# Patient Record
Sex: FEMALE
[De-identification: names, ages, dates, MRNs, and addresses within clinical notes are randomized per-mention and may not be internally consistent; named-entity substitution may affect disease eponyms.]

---

## 2018-11-28 ENCOUNTER — HOSPITAL ENCOUNTER (OUTPATIENT)
Dept: HOSPITAL 47 - RADECHMAIN | Age: 53
Discharge: HOME | End: 2018-11-28
Attending: FAMILY MEDICINE
Payer: COMMERCIAL

## 2018-11-28 DIAGNOSIS — I08.1: Primary | ICD-10-CM

## 2018-11-28 PROCEDURE — 93306 TTE W/DOPPLER COMPLETE: CPT

## 2019-01-18 ENCOUNTER — HOSPITAL ENCOUNTER (OUTPATIENT)
Dept: HOSPITAL 47 - OR | Age: 54
Discharge: HOME | End: 2019-01-18
Payer: COMMERCIAL

## 2019-01-18 VITALS — BODY MASS INDEX: 47 KG/M2

## 2019-01-18 VITALS — TEMPERATURE: 97.4 F

## 2019-01-18 VITALS — DIASTOLIC BLOOD PRESSURE: 63 MMHG | SYSTOLIC BLOOD PRESSURE: 133 MMHG

## 2019-01-18 VITALS — HEART RATE: 90 BPM | RESPIRATION RATE: 16 BRPM

## 2019-01-18 DIAGNOSIS — Z80.0: ICD-10-CM

## 2019-01-18 DIAGNOSIS — Z88.5: ICD-10-CM

## 2019-01-18 DIAGNOSIS — E03.9: ICD-10-CM

## 2019-01-18 DIAGNOSIS — K76.0: ICD-10-CM

## 2019-01-18 DIAGNOSIS — Z79.890: ICD-10-CM

## 2019-01-18 DIAGNOSIS — E66.01: ICD-10-CM

## 2019-01-18 DIAGNOSIS — K66.0: ICD-10-CM

## 2019-01-18 DIAGNOSIS — K80.44: Primary | ICD-10-CM

## 2019-01-18 LAB
ALBUMIN SERPL-MCNC: 4.2 G/DL (ref 3.5–5)
ALP SERPL-CCNC: 116 U/L (ref 38–126)
ALT SERPL-CCNC: 50 U/L (ref 9–52)
ANION GAP SERPL CALC-SCNC: 8 MMOL/L
AST SERPL-CCNC: 36 U/L (ref 14–36)
BASOPHILS # BLD AUTO: 0 K/UL (ref 0–0.2)
BASOPHILS NFR BLD AUTO: 1 %
BUN SERPL-SCNC: 15 MG/DL (ref 7–17)
CALCIUM SPEC-MCNC: 9.7 MG/DL (ref 8.4–10.2)
CHLORIDE SERPL-SCNC: 109 MMOL/L (ref 98–107)
CO2 SERPL-SCNC: 27 MMOL/L (ref 22–30)
EOSINOPHIL # BLD AUTO: 0.4 K/UL (ref 0–0.7)
EOSINOPHIL NFR BLD AUTO: 6 %
ERYTHROCYTE [DISTWIDTH] IN BLOOD BY AUTOMATED COUNT: 5.05 M/UL (ref 3.8–5.4)
ERYTHROCYTE [DISTWIDTH] IN BLOOD: 13.2 % (ref 11.5–15.5)
GLUCOSE SERPL-MCNC: 92 MG/DL (ref 74–99)
HCT VFR BLD AUTO: 46.7 % (ref 34–46)
HGB BLD-MCNC: 15.4 GM/DL (ref 11.4–16)
LYMPHOCYTES # SPEC AUTO: 2 K/UL (ref 1–4.8)
LYMPHOCYTES NFR SPEC AUTO: 31 %
MCH RBC QN AUTO: 30.5 PG (ref 25–35)
MCHC RBC AUTO-ENTMCNC: 33 G/DL (ref 31–37)
MCV RBC AUTO: 92.5 FL (ref 80–100)
MONOCYTES # BLD AUTO: 0.3 K/UL (ref 0–1)
MONOCYTES NFR BLD AUTO: 5 %
NEUTROPHILS # BLD AUTO: 3.6 K/UL (ref 1.3–7.7)
NEUTROPHILS NFR BLD AUTO: 56 %
PLATELET # BLD AUTO: 316 K/UL (ref 150–450)
POTASSIUM SERPL-SCNC: 4.7 MMOL/L (ref 3.5–5.1)
PROT SERPL-MCNC: 8 G/DL (ref 6.3–8.2)
SODIUM SERPL-SCNC: 144 MMOL/L (ref 137–145)
WBC # BLD AUTO: 6.4 K/UL (ref 3.8–10.6)

## 2019-01-18 PROCEDURE — 47562 LAPAROSCOPIC CHOLECYSTECTOMY: CPT

## 2019-01-18 PROCEDURE — 81025 URINE PREGNANCY TEST: CPT

## 2019-01-18 PROCEDURE — 80053 COMPREHEN METABOLIC PANEL: CPT

## 2019-01-18 PROCEDURE — 88304 TISSUE EXAM BY PATHOLOGIST: CPT

## 2019-01-18 PROCEDURE — 85025 COMPLETE CBC W/AUTO DIFF WBC: CPT

## 2019-01-18 NOTE — P.GSHP
History of Present Illness


H&P Date: 01/18/19








CHIEF COMPLAINT: Cholecystitis 





HISTORY OF PRESENT ILLNESS: The patient is a 53-year-old female who presents 

with history of epigastric including right upper quadrant abdominal pain.  She 

underwent diagnostic studies for her gallbladder.  Separately her clinical 

picture was consistent with cholecystitis.  Now she presents for surgical 

intervention.





PAST MEDICAL HISTORY: 


Please see list





PAST SURGICAL HISTORY: 


Please see list





MEDICATIONS: 


Please see list





ALLERGIES: Please see list





SOCIAL HISTORY: Please see list





FAMILY HISTORY: Pertinent for gallbladder disease 





REVIEW OF ORGAN SYSTEMS: 


CONSTITUTIONAL: No reports of fevers or chills. 


HEENT: Denies any troubles with the vision or hearing. 








PHYSICAL EXAM: 


VITAL SIGNS: 


Afebrile vital signs stable


GENERAL: Well-developed pleasant in no acute distress. 


HEENT: No scleral icterus. Extraocular movements grossly intact. Moist buccal 

mucosa. 


NECK: Supple without lymphadenopathy. 


CHEST: Unlabored respirations. Equal bilateral excursions. 


CARDIOVASCULAR: Regular rate regular rhythm rhythm. Distal 2+ pulses. 


ABDOMEN: Soft, nondistended.  Tender along the epigastrium and right upper 

quadrant.


MUSCULOSKELETAL: No clubbing, cyanosis, or edema. 


NEURO: Cranial nerves II to XII within normal limits. No focal or lateralizing 

signs.


PSYCH: Alert and oriented to person, place and time. 


SKIN: Well-perfused good skin turgor.





ASSESSMENT: 


1.  Epigastric and right upper quadrant abdominal pain


2.  Chronic cholecystitis


3.  Symptomatic gallstones.





PLAN: 


1.  Will need a robotic cholecystectomy possible open.  Benefits and risks were 

described. 


2.  Heparin for DVT prophylaxis 5000 units.


3.  Antibiotic prophylaxis.





Past Medical History


Past Medical History: Thyroid Disorder


History of Any Multi-Drug Resistant Organisms: None Reported


Past Surgical History: Tonsillectomy


Past Anesthesia/Blood Transfusion Reactions: No Reported Reaction


Smoking Status: Never smoker





- Past Family History


  ** Mother


Family Medical History: Cancer, Pulmonary Embolus


Additional Family Medical History / Comment(s): colon cancer





  ** Father


Family Medical History: Hypertension





  ** Brother(s)


Additional Family Medical History / Comment(s): cardiomyopathy





Medications and Allergies


 Home Medications











 Medication  Instructions  Recorded  Confirmed  Type


 


Levothyroxine Sodium [Synthroid] 75 mcg PO DAILY 01/17/19 01/17/19 History











 Allergies











Allergy/AdvReac Type Severity Reaction Status Date / Time


 


codeine Allergy  Rapid Verified 01/17/19 08:21





   Heart Rate

## 2019-01-18 NOTE — P.OP
Date of Procedure: 01/18/19


Description of Procedure: 











SURGEON:  REY PHIPPS MD





PREOPERATIVE DIAGNOSES:  


1.  Right upper quadrant abdominal pain


2.  Chronic cholecystitis


3.  Morbid obesity due to excess calories, BMI 47.0


4.  Hypothyroidism





POSTOPERATIVE DIAGNOSES:  


1.  Right upper quadrant abdominal pain


2.  Chronic cholecystitis with large over 2 cm gallstone


3.  Morbid obesity due to excess calories, BMI 47.0


4.  Hypothyroidism


5.  Hepatomegaly with fatty liver disease





OPERATION:       


1.  Robotic-assisted da Gregory Xi laparoscopic cholecystectomy, multiport with 

FIREFLY


2.  Robotic-assisted da Gregory Xi laparoscopic extensive lysis of adhesions over 

30 minutes


 


ESTIMATED BLOOD LOSS: 10 mL.


SPECIMENS REMOVED:  Gallbladder.


COMPLICATIONS:  None.





OPERATIVE FINDINGS:  


1.  Chronic cholecystitis with severe peritoneal adhesions about the gallbladder


2.  2 cm gallstone adding moderate complexity to case





INDICATIONS: The patient is a 53-year-old female who presents with 

cholelcystitis.


Surgical intervention with a laparoscopic cholecystectomy was described at 

length including injury to the biliary


tree, bleeding, infection, need for further surgery. Informed consent was 

obtained.  Robotic 


assisted laparoscopic approach was described. Benefits and risks of the 

procedure including but not limited to bleeding, 


infection, injury to the biliary tree was described. Informed consent was 

obtained.  





DESCRIPTION OF PROCEDURE: Patient was brought to the operating room, 


placed in supine position. After general induction, the abdomen had 


been prepped and draped in standard sterile fashion. The robotic da Gregory 


XI system was primed.  





After a timeout protocol was performed, the patient had been prepped 


and draped in standard sterile fashion.





The patient was injected with indocyanine green.





A 5 mm 0 degrees laparoscopic trocar entry was performed along the left upper 

quadrant.  The abdomen insufflated to 15 mmHg pressure which was tolerated 

well. Diagnostic laparoscopy demonstrated no injury to bowel viscera or 

mesentery.  The liver surface was remarkable for hepatomegaly and fatty liver 

disease. Next, two 8 mm robotic ports were placed along the right upper 

abdomen. The camera 8-mm port was maintained along the epigastrium.  Another 8 

mm port was placed along the left upper abdominal wall after exchanging the 5 

mm port. Please note that the ports were placed at least 10 to 15 cm away from 

the target anatomy of the gallbladder. 





The robot was docked along the left lateral abdomen. 


The patient was repositioned in reverse Trendelenburg position. 





Using a grasper for arm 3, a grasper for arm 4, including hook cautery for arm 1

, the 


robotic system was docked and primed as described.  





Instruments were interchanged by the assistant including hook cautery, Bovie 

cautery and clip appliers.





I had sat at the console. 





Severe peritoneal adhesions were identified incorporating the entire body 

fundus and infundibulum of the gallbladder requiring over 30 minutes of 

extensive lysis of adhesions using hook cautery.





The gallbladder fundus was retracted over the dome of the liver.


Initial attention was brought to the infundibulum which was gently


retracted in the inferior lateral approach. 





Combination of dome down technique was performed for isolating the cystic 

structures.  





Using a grasper, the cystic duct including the cystic artery was carefully 

skeletonized.


FIREFLY was used to identify the cystic artery and cystic structures.


Large PLASTIC clips were used throughout the entire case.


Using a clip applier 2 clips were placed proximally, and 1 clip was placed 


distally along the cystic duct and then cauterized with the cautery. Again care 

was taken to


avoid any injury to the biliary tree as the common bile duct was clearly


visualized during this portion of dissection. Next, the cystic artery


was similarly clipped and cauterized.





Electro-Bovie cautery was used to remove the gallbladder from the


hepatic fossa. Hemostasis was checked and found to be adequate. 





The robot was undocked.





I re-scrubbed into the case.





Using a 10 mm Endo Catch bag via the left upper quadrant incision, the 


specimen was removed from the abdominal cavity. 





All pneumoperitoneum instruments were evacuated from the abdominal 


cavity. The incisions were reapproximated using 4-0 Monocryl in an interrupted 


subcuticular fashion.  Fascial defects were less than 8 mm in size.  


Please note along the trocar sites, local anesthetic was placed as a field 

block 


prior to insertion of all instruments.  





Liquid glue was applied to the skin.





At the end of the procedure needle, sponge, and instrument count had 


been verified correct by the surgical technician. The patient was 


transferred to postanesthesia care unit in stable condition.





Intraoperative films were shared with the patient's family who were very 

pleased with the level of care.  





Console time 50 minutes








Plan - Discharge Summary


New Discharge Prescriptions: 


No Action


   Levothyroxine Sodium [Synthroid] 75 mcg PO DAILY


Discharge Medication List





Levothyroxine Sodium [Synthroid] 75 mcg PO DAILY 01/17/19 [History]

## 2023-06-17 ENCOUNTER — HOSPITAL ENCOUNTER (OUTPATIENT)
Dept: HOSPITAL 47 - LABWHC1 | Age: 58
Discharge: HOME | End: 2023-06-17
Attending: FAMILY MEDICINE
Payer: COMMERCIAL

## 2023-06-17 DIAGNOSIS — Z00.01: Primary | ICD-10-CM

## 2023-06-17 DIAGNOSIS — E03.9: ICD-10-CM

## 2023-06-17 DIAGNOSIS — Z51.81: ICD-10-CM

## 2023-06-17 DIAGNOSIS — Z13.1: ICD-10-CM

## 2023-06-17 LAB
ERYTHROCYTE [DISTWIDTH] IN BLOOD BY AUTOMATED COUNT: 5.38 X 10*6/UL (ref 4.1–5.2)
ERYTHROCYTE [DISTWIDTH] IN BLOOD: 12.3 % (ref 11.5–14.5)
HCT VFR BLD AUTO: 49.7 % (ref 37.2–46.3)
HGB BLD-MCNC: 15.8 D/DL (ref 12–15)
MCH RBC QN AUTO: 29.4 PG (ref 27–32)
MCHC RBC AUTO-ENTMCNC: 31.8 D/DL (ref 32–37)
MCV RBC AUTO: 92.4 FL (ref 80–97)
NRBC BLD AUTO-RTO: 0 X 10*3/UL (ref 0–0.01)
PLATELET # BLD AUTO: 358 X 10*3/UL (ref 140–440)
WBC # BLD AUTO: 7.29 X 10*3/UL (ref 4.5–10)

## 2023-06-17 PROCEDURE — 84443 ASSAY THYROID STIM HORMONE: CPT

## 2023-06-17 PROCEDURE — 82306 VITAMIN D 25 HYDROXY: CPT

## 2023-06-17 PROCEDURE — 36415 COLL VENOUS BLD VENIPUNCTURE: CPT

## 2023-06-17 PROCEDURE — 85027 COMPLETE CBC AUTOMATED: CPT

## 2023-06-17 PROCEDURE — 84481 FREE ASSAY (FT-3): CPT

## 2023-06-17 PROCEDURE — 83036 HEMOGLOBIN GLYCOSYLATED A1C: CPT

## 2023-06-17 PROCEDURE — 80061 LIPID PANEL: CPT

## 2023-06-17 PROCEDURE — 84439 ASSAY OF FREE THYROXINE: CPT

## 2023-06-17 PROCEDURE — 80053 COMPREHEN METABOLIC PANEL: CPT

## 2023-06-18 LAB
ALBUMIN SERPL-MCNC: 4.2 D/DL (ref 3.8–4.9)
ALBUMIN/GLOB SERPL: 1.24 RATIO (ref 1.6–3.17)
ALP SERPL-CCNC: 131 U/L (ref 41–126)
ALT SERPL-CCNC: 22 U/L (ref 8–44)
ANION GAP SERPL CALC-SCNC: 10.1 MMOL/L (ref 4–12)
AST SERPL-CCNC: 17 U/L (ref 13–35)
BUN SERPL-SCNC: 17.5 MG/DL (ref 9–27)
BUN/CREAT SERPL: 17.5 RATIO (ref 12–20)
CALCIUM SPEC-MCNC: 9.7 MG/DL (ref 8.7–10.3)
CHLORIDE SERPL-SCNC: 104 MMOL/L (ref 96–109)
CHOLEST SERPL-MCNC: 228 MG/DL (ref 0–200)
CO2 SERPL-SCNC: 25.9 MMOL/L (ref 21.6–31.8)
GLOBULIN SER CALC-MCNC: 3.4 D/DL (ref 1.6–3.3)
GLUCOSE SERPL-MCNC: 94 MG/DL (ref 70–110)
HDLC SERPL-MCNC: 64.4 MG/DL (ref 40–60)
LDLC SERPL CALC-MCNC: 145.9 MG/DL (ref 0–131)
POTASSIUM SERPL-SCNC: 5.4 MMOL/L (ref 3.5–5.5)
PROT SERPL-MCNC: 7.6 D/DL (ref 6.2–8.2)
SODIUM SERPL-SCNC: 140 MMOL/L (ref 135–145)
T4 FREE SERPL-MCNC: 1.44 NG/DL (ref 0.8–1.8)
TRIGL SERPL-MCNC: 88.3 MG/DL (ref 0–149)
VLDLC SERPL CALC-MCNC: 17.66 MG/DL (ref 5–40)

## 2024-08-31 ENCOUNTER — HOSPITAL ENCOUNTER (OUTPATIENT)
Dept: HOSPITAL 47 - LABWHC1 | Age: 59
End: 2024-08-31
Attending: FAMILY MEDICINE
Payer: COMMERCIAL

## 2024-08-31 DIAGNOSIS — Z13.1: ICD-10-CM

## 2024-08-31 DIAGNOSIS — Z00.01: Primary | ICD-10-CM

## 2024-08-31 DIAGNOSIS — E03.9: ICD-10-CM

## 2024-08-31 PROCEDURE — 84481 FREE ASSAY (FT-3): CPT

## 2024-08-31 PROCEDURE — 83036 HEMOGLOBIN GLYCOSYLATED A1C: CPT

## 2024-08-31 PROCEDURE — 36415 COLL VENOUS BLD VENIPUNCTURE: CPT

## 2024-08-31 PROCEDURE — 84443 ASSAY THYROID STIM HORMONE: CPT

## 2024-08-31 PROCEDURE — 84439 ASSAY OF FREE THYROXINE: CPT

## 2024-08-31 PROCEDURE — 80053 COMPREHEN METABOLIC PANEL: CPT

## 2024-08-31 PROCEDURE — 85027 COMPLETE CBC AUTOMATED: CPT

## 2024-08-31 PROCEDURE — 80061 LIPID PANEL: CPT

## 2024-09-01 LAB
ALBUMIN SERPL-MCNC: 4 G/DL (ref 3.8–4.9)
ALBUMIN/GLOB SERPL: 1.29 RATIO (ref 1.6–3.17)
ALP SERPL-CCNC: 128 U/L (ref 41–126)
ALT SERPL-CCNC: 20 U/L (ref 8–44)
ANION GAP SERPL CALC-SCNC: 10.1 MMOL/L (ref 4–12)
AST SERPL-CCNC: 17 U/L (ref 13–35)
BUN SERPL-SCNC: 14.9 MG/DL (ref 9–27)
BUN/CREAT SERPL: 14.9 RATIO (ref 12–20)
CALCIUM SPEC-MCNC: 9.1 MG/DL (ref 8.7–10.3)
CHLORIDE SERPL-SCNC: 106 MMOL/L (ref 96–109)
CHOLEST SERPL-MCNC: 207 MG/DL (ref 0–200)
CO2 SERPL-SCNC: 22.9 MMOL/L (ref 21.6–31.8)
ERYTHROCYTE [DISTWIDTH] IN BLOOD BY AUTOMATED COUNT: 5.06 X 10*6/UL (ref 4.1–5.2)
ERYTHROCYTE [DISTWIDTH] IN BLOOD: 12.2 % (ref 11.5–14.5)
GLOBULIN SER CALC-MCNC: 3.1 G/DL (ref 1.6–3.3)
GLUCOSE SERPL-MCNC: 99 MG/DL (ref 70–110)
HCT VFR BLD AUTO: 45.7 % (ref 37.2–46.3)
HDLC SERPL-MCNC: 53 MG/DL (ref 40–60)
HGB BLD-MCNC: 14.9 G/DL (ref 12–15)
LDLC SERPL CALC-MCNC: 132.4 MG/DL (ref 0–131)
MCH RBC QN AUTO: 29.4 PG (ref 27–32)
MCHC RBC AUTO-ENTMCNC: 32.6 G/DL (ref 32–37)
MCV RBC AUTO: 90.3 FL (ref 80–97)
NRBC BLD AUTO-RTO: 0 X 10*3/UL (ref 0–0.01)
PLATELET # BLD AUTO: 344 X 10*3/UL (ref 140–440)
POTASSIUM SERPL-SCNC: 4.7 MMOL/L (ref 3.5–5.5)
PROT SERPL-MCNC: 7.1 G/DL (ref 6.2–8.2)
SODIUM SERPL-SCNC: 139 MMOL/L (ref 135–145)
T4 FREE SERPL-MCNC: 1.39 NG/DL (ref 0.8–1.8)
TRIGL SERPL-MCNC: 108 MG/DL (ref 0–149)
VLDLC SERPL CALC-MCNC: 21.6 MG/DL (ref 5–40)
WBC # BLD AUTO: 7.29 X 10*3/UL (ref 4.5–10)

## 2024-09-26 ENCOUNTER — HOSPITAL ENCOUNTER (OUTPATIENT)
Dept: HOSPITAL 47 - RADMRIMAIN | Age: 59
Discharge: HOME | End: 2024-09-26
Attending: FAMILY MEDICINE
Payer: COMMERCIAL

## 2024-09-26 NOTE — MR
EXAMINATION TYPE: MR knee LT wo con

 

DATE OF EXAM: 9/26/2024

 

COMPARISON: None

 

HISTORY: Left knee pain x 2 years, no trauma.

 

TECHNIQUE: Multiplanar, multisequence imaging of the left knee is performed without IV contrast.

 

FINDINGS:

 

MEDIAL MENISCUS: Intrasubstance signal posterior horn medial meniscus.

 

LATERAL MENISCUS: Anterior and posterior horns are intact without tear.

 

CRUCIATE LIGAMENTS: The anterior and posterior cruciate ligaments are intact and unremarkable.

 

COLLATERAL LIGAMENTS: The medial collateral ligament and lateral collateral ligament complex are inta
ct and unremarkable.

 

EXTENSOR MECHANISM: Visualized quadriceps and patellar tendons are intact.

 

EFFUSION:  Mild induration of the fat along the suprapatellar fat pad can be associated with fat-pad 
impingement syndrome.

 

POPLITEAL CYST:  No popliteal/baker cyst.

 

TRICOMPARTMENT SPACES: Mild narrowing of the tricompartment joint spaces. No erosive changes. There i
s grade II chondromalacia in the patellar cartilage.

 

BONE MARROW SIGNAL: Intraosseous lesion of the distal diaphysis of the femur likely related to bone i
nfarct or chondroid lesion. Recommend bone scan low signal of the proximal fibula on all sequences mo
st likely related to small bone island.

 

 

 

IMPRESSION:

1. Mild osteoarthritis with grade II chondromalacia patellar cartilage.

2. Intrasubstance signal posterior horn medial meniscus. Favor myxoid degeneration over linear tear. 
Correlate clinically.

3. Intraosseous lesion distal femur favored bone infarct versus chondroid lesion. Recommend bone scan
.

 

[facility], Workstation: [id number], 9/26/2024 9:38 AM

## 2024-11-07 ENCOUNTER — HOSPITAL ENCOUNTER (OUTPATIENT)
Dept: HOSPITAL 47 - LABPAT | Age: 59
Discharge: HOME | End: 2024-11-07
Attending: ORTHOPAEDIC SURGERY
Payer: COMMERCIAL

## 2024-11-07 DIAGNOSIS — M23.92: ICD-10-CM

## 2024-11-07 DIAGNOSIS — Z01.818: Primary | ICD-10-CM

## 2024-11-07 LAB
ANION GAP SERPL CALC-SCNC: 10.1 MMOL/L (ref 4–12)
BASOPHILS # BLD AUTO: 0.07 X 10*3/UL (ref 0–0.1)
BASOPHILS NFR BLD AUTO: 0.9 %
CHLORIDE SERPL-SCNC: 105 MMOL/L (ref 96–109)
CO2 SERPL-SCNC: 25.9 MMOL/L (ref 21.6–31.8)
EOSINOPHIL # BLD AUTO: 0.3 X 10*3/UL (ref 0.04–0.35)
EOSINOPHIL NFR BLD AUTO: 3.7 %
ERYTHROCYTE [DISTWIDTH] IN BLOOD BY AUTOMATED COUNT: 5.09 X 10*6/UL (ref 4.1–5.2)
ERYTHROCYTE [DISTWIDTH] IN BLOOD: 12.7 % (ref 11.5–14.5)
HCT VFR BLD AUTO: 45.5 % (ref 37.2–46.3)
HGB BLD-MCNC: 14.9 G/DL (ref 12–15)
IMM GRANULOCYTES BLD QL AUTO: 0.4 %
LYMPHOCYTES # SPEC AUTO: 2.23 X 10*3/UL (ref 0.9–5)
LYMPHOCYTES NFR SPEC AUTO: 27.7 %
MCH RBC QN AUTO: 29.3 PG (ref 27–32)
MCHC RBC AUTO-ENTMCNC: 32.7 G/DL (ref 32–37)
MCV RBC AUTO: 89.4 FL (ref 80–97)
MONOCYTES # BLD AUTO: 0.64 X 10*3/UL (ref 0.2–1)
MONOCYTES NFR BLD AUTO: 8 %
NEUTROPHILS # BLD AUTO: 4.78 X 10*3/UL (ref 1.8–7.7)
NEUTROPHILS NFR BLD AUTO: 59.3 %
NRBC BLD AUTO-RTO: 0 X 10*3/UL (ref 0–0.01)
PLATELET # BLD AUTO: 343 X 10*3/UL (ref 140–440)
POTASSIUM SERPL-SCNC: 4.6 MMOL/L (ref 3.5–5.5)
SODIUM SERPL-SCNC: 141 MMOL/L (ref 135–145)
WBC # BLD AUTO: 8.05 X 10*3/UL (ref 4.5–10)

## 2024-11-07 PROCEDURE — 85025 COMPLETE CBC W/AUTO DIFF WBC: CPT

## 2024-11-07 PROCEDURE — 80051 ELECTROLYTE PANEL: CPT

## 2024-11-07 PROCEDURE — 93005 ELECTROCARDIOGRAM TRACING: CPT

## 2024-11-27 ENCOUNTER — HOSPITAL ENCOUNTER (OUTPATIENT)
Dept: HOSPITAL 47 - OR | Age: 59
Discharge: HOME | End: 2024-11-27
Attending: ORTHOPAEDIC SURGERY
Payer: COMMERCIAL

## 2024-11-27 VITALS — TEMPERATURE: 96.8 F

## 2024-11-27 VITALS — HEART RATE: 83 BPM | DIASTOLIC BLOOD PRESSURE: 72 MMHG | SYSTOLIC BLOOD PRESSURE: 109 MMHG

## 2024-11-27 VITALS — RESPIRATION RATE: 14 BRPM

## 2024-11-27 VITALS — BODY MASS INDEX: 50 KG/M2

## 2024-11-27 DIAGNOSIS — M17.12: ICD-10-CM

## 2024-11-27 DIAGNOSIS — Z79.890: ICD-10-CM

## 2024-11-27 DIAGNOSIS — Z88.5: ICD-10-CM

## 2024-11-27 DIAGNOSIS — E03.9: ICD-10-CM

## 2024-11-27 DIAGNOSIS — M94.262: ICD-10-CM

## 2024-11-27 DIAGNOSIS — M65.862: ICD-10-CM

## 2024-11-27 DIAGNOSIS — S83.282A: Primary | ICD-10-CM

## 2024-11-27 PROCEDURE — 29876 ARTHRS KNEE SURG SYNVCT MAJ: CPT

## 2024-11-27 PROCEDURE — 29879 ARTHRS KNE SRG ABRASJ ARTHRP: CPT

## 2024-11-27 PROCEDURE — 29880 ARTHRS KNE SRG MNISECTMY M&L: CPT

## 2024-11-27 RX ADMIN — HYDROMORPHONE HYDROCHLORIDE STA MG: 1 INJECTION, SOLUTION INTRAMUSCULAR; INTRAVENOUS; SUBCUTANEOUS at 10:45

## 2024-11-27 RX ADMIN — ONDANSETRON STA MG: 4 TABLET, ORALLY DISINTEGRATING ORAL at 12:51

## 2024-11-27 RX ADMIN — BUPIVACAINE HYDROCHLORIDE ONE ML: 2.5 INJECTION, SOLUTION EPIDURAL; INFILTRATION; INTRACAUDAL; PERINEURAL at 10:04

## 2024-11-27 RX ADMIN — BUPIVACAINE HYDROCHLORIDE ONE ML: 2.5 INJECTION, SOLUTION EPIDURAL; INFILTRATION; INTRACAUDAL; PERINEURAL at 09:29

## 2024-11-27 RX ADMIN — POTASSIUM CHLORIDE SCH MLS/HR: 14.9 INJECTION, SOLUTION INTRAVENOUS at 08:45

## 2024-11-27 RX ADMIN — ONDANSETRON ONE MG: 2 INJECTION INTRAMUSCULAR; INTRAVENOUS at 08:45

## 2024-11-27 RX ADMIN — POTASSIUM CHLORIDE ONE MLS: 14.9 INJECTION, SOLUTION INTRAVENOUS at 08:32

## 2024-11-27 RX ADMIN — DEXAMETHASONE SODIUM PHOSPHATE ONE MG: 4 INJECTION, SOLUTION INTRAMUSCULAR; INTRAVENOUS at 08:45

## 2024-11-29 ENCOUNTER — HOSPITAL ENCOUNTER (EMERGENCY)
Dept: HOSPITAL 47 - EC | Age: 59
LOS: 1 days | Discharge: HOME | End: 2024-11-30
Payer: COMMERCIAL

## 2024-11-29 VITALS — TEMPERATURE: 98.4 F

## 2024-11-29 DIAGNOSIS — M79.604: Primary | ICD-10-CM

## 2024-11-29 DIAGNOSIS — Z88.5: ICD-10-CM

## 2024-11-29 PROCEDURE — 99283 EMERGENCY DEPT VISIT LOW MDM: CPT

## 2024-11-30 VITALS — HEART RATE: 82 BPM | SYSTOLIC BLOOD PRESSURE: 130 MMHG | RESPIRATION RATE: 16 BRPM | DIASTOLIC BLOOD PRESSURE: 81 MMHG

## 2024-12-31 ENCOUNTER — HOSPITAL ENCOUNTER (EMERGENCY)
Dept: HOSPITAL 47 - EC | Age: 59
Discharge: HOME | End: 2024-12-31
Payer: COMMERCIAL

## 2024-12-31 VITALS — DIASTOLIC BLOOD PRESSURE: 92 MMHG | SYSTOLIC BLOOD PRESSURE: 129 MMHG | RESPIRATION RATE: 18 BRPM | TEMPERATURE: 98 F

## 2024-12-31 VITALS — HEART RATE: 92 BPM
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
## 2024-12-31 DIAGNOSIS — Z23: ICD-10-CM

## 2024-12-31 DIAGNOSIS — S00.03XA: Primary | ICD-10-CM

## 2024-12-31 DIAGNOSIS — Z88.5: ICD-10-CM
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
## 2024-12-31 PROCEDURE — 90471 IMMUNIZATION ADMIN: CPT

## 2024-12-31 PROCEDURE — 72125 CT NECK SPINE W/O DYE: CPT

## 2024-12-31 PROCEDURE — 70450 CT HEAD/BRAIN W/O DYE: CPT

## 2024-12-31 PROCEDURE — 99283 EMERGENCY DEPT VISIT LOW MDM: CPT

## 2024-12-31 PROCEDURE — 90715 TDAP VACCINE 7 YRS/> IM: CPT

## 2024-12-31 RX ADMIN — TETANUS TOXOID, REDUCED DIPHTHERIA TOXOID AND ACELLULAR PERTUSSIS VACCINE, ADSORBED ONE ML: 5; 2.5; 8; 8; 2.5 SUSPENSION INTRAMUSCULAR at 20:30

## 2024-12-31 NOTE — CT
EXAMINATION TYPE: CT brain nestor wo con

 

DATE OF EXAM: 12/31/2024 7:43 PM

 

COMPARISON: Non-

 

CLINICAL INDICATION: Female, 59 years old with pain after history of fall, Fell down stairs, neck bhavani
n, 

 

Technique: Examination of the head was done in axial plane without intravenous contrast. Coronal and 
sagittal reconstructions performed.

CT of the cervical spine was obtained in axial plane without intravenous injection of  contrast mater
ial.  Coronal and sagittal reformatted images were obtained from the axial views for evaluation of  f
ractures, spinal alignment and canal.

 

CT DLP: 1560.9 mGycm, Automated exposure control for dose reduction was used.

 

FINDINGS:

Head:

There is no evidence of  acute intracranial hemorrhage, acute ischemic changes, mass, mass-effect, or
 extra-axial fluid collection.  There is no effacement of cerebral sulci or basal subarachnoid cister
ns.  There is no hydrocephalus.  There is no midline shift.  Gray-white matter distinction is preserv
ed.

 

Prominent dental amalgam artifact obscuring the inferior aspect of the posterior cranial fossa. Addit
ionally, there is a prominent posterior superior scalp contusion. No underlying calvarial fracture.

 

Leftward nasal septal deviation. Paranasal sinuses and mastoid air cells are well pneumatized. Orbits
 and globes are intact.

 

Cervical spine:

No craniocervical junction abnormality, predental space widening, or prevertebral soft tissue swellin
g.

 

Suggestion of some degenerative bony ankylosis involving the posterior elements C2-C4 levels.

 

Hypertrophic facet arthropathy with grade 1 anterolisthesis C4-C5.

 

Disc osteophyte complex with moderate degenerative disc disease at C6-C7 may contribute to a moderate
 focal spinal canal stenosis.

 

No acute fractures seen of the cervical spine.

 

At C4-C5, there is moderate left neural foraminal stenosis.

At C6/C7, there is moderate right greater than left neuroforaminal stenosis. 

 

Sagittal and coronal reformatted images confirm above findings.

 

 

COMBINED IMPRESSION:

1. Posterior scalp hematoma. No acute intracranial abnormality seen.

2. Mild-to-moderate spondylotic change. Disc osteophyte complex at C6-C7 may contribute to a moderate
 spinal canal stenosis. Degenerative grade 1 anterolisthesis C4-C5. Degenerative bony ankylosis of th
e posterior elements C2-C4 levels. No acute fracture seen of the cervical spine.

 

[facility], Workstation: [id number], 12/31/2024 7:52 PM

## 2024-12-31 NOTE — ED
General Adult HPI





- General


Chief complaint: Fall


Stated complaint: Fall, head injury


Time Seen by Provider: 12/31/24 19:14


Source: patient, RN notes reviewed


Mode of arrival: ambulatory


Limitations: no limitations





- History of Present Illness


Initial comments: 





51-year-old female presents to the emergency department for evaluation of fall 

with head injury.  Patient reports that she was walking up the steps when she 

missed her step causing her to fall backward.  She states that she slid down the

steps on her back and hit her head on the floor below.  She denies loss of 

consciousness, blood thinners.  She reports a hematoma to her posterior scalp.  

She denies any other significant injury.  She is able to ambulate and move all 

extremities without limitation.





- Related Data


                                Home Medications











 Medication  Instructions  Recorded  Confirmed


 


Levothyroxine Sodium 100 mcg PO DAILY 11/25/24 11/27/24








                                  Previous Rx's











 Medication  Instructions  Recorded


 


traMADol HCl [Ultram] 50 mg PO Q6H PRN #12 tab 11/27/24











                                    Allergies











Allergy/AdvReac Type Severity Reaction Status Date / Time


 


codeine Allergy  Rapid Verified 12/31/24 19:08





   Heart Rate  














Review of Systems


ROS Statement: 


Those systems with pertinent positive or pertinent negative responses have been 

documented in the HPI.





ROS Other: All systems not noted in ROS Statement are negative.





Past Medical History


Past Medical History: Thyroid Disorder


History of Any Multi-Drug Resistant Organisms: None Reported


Past Surgical History: Cholecystectomy, Orthopedic Surgery, Tonsillectomy


Additional Past Surgical History / Comment(s): COLONOSCOPY


Past Anesthesia/Blood Transfusion Reactions: No Reported Reaction


Past Psychological History: No Psychological Hx Reported


Smoking Status: Never smoker


Past Alcohol Use History: None Reported


Past Drug Use History: None Reported





- Past Family History


  ** Mother


Family Medical History: Cancer, Pulmonary Embolus


Additional Family Medical History / Comment(s): colon cancer





  ** Father


Family Medical History: Hypertension, Myocardial Infarction (MI)





  ** Brother(s)


Additional Family Medical History / Comment(s): cardiomyopathy





General Exam


Limitations: no limitations


General appearance: alert, in no apparent distress


Head exam: Present: other (Posterior scalp hematoma)


Eye exam: Present: normal appearance, PERRL, EOMI.  Absent: scleral icterus, 

conjunctival injection, periorbital swelling


ENT exam: Present: normal exam, mucous membranes moist


Respiratory exam: Present: normal lung sounds bilaterally.  Absent: respiratory 

distress, wheezes, rales, rhonchi, stridor


Cardiovascular Exam: Present: regular rate, normal rhythm, normal heart sounds. 

 Absent: systolic murmur, diastolic murmur, rubs, gallop, clicks


Extremities exam: Present: normal inspection, full ROM, normal capillary refill.

  Absent: tenderness, pedal edema, joint swelling, calf tenderness


Back exam: Present: normal inspection


Neurological exam: Present: alert, oriented X3, CN II-XII intact, normal gait.  

Absent: motor sensory deficit


Psychiatric exam: Present: normal affect, normal mood


Skin exam: Present: warm, dry, normal color, abrasion (Abrasion over posterior 

scalp).  Absent: rash





Course


                                   Vital Signs











  12/31/24 12/31/24





  19:08 20:24


 


Temperature 98 F 


 


Pulse Rate 128 H 92


 


Respiratory 18 





Rate  


 


Blood Pressure 129/92 


 


O2 Sat by Pulse 96 





Oximetry  














Medical Decision Making





- Medical Decision Making





Was pt. sent in by a medical professional or institution (, PA, NP, urgent 

care, hospital, or nursing home...) When possible be specific


@  -No


Did you speak to anyone other than the patient for history (EMS, parent, family,

 police, friend...)? What history was obtained from this source 


@  -No


Did you review nursing and triage notes (agree or disagree)?  Why? 


@  -I reviewed and agree with nursing and triage notes


Were old charts reviewed (outside hosp., previous admission, EMS record, old 

EKG, old radiological studies, urgent care reports/EKG's, nursing home records)?

 Report findings 


@  -No old charts were reviewed


Differential Diagnosis (chest pain, altered mental status, abdominal pain women,

 abdominal pain men, vaginal bleeding, weakness, fever, dyspnea, syncope, 

headache, dizziness, GI bleed, back pain, seizure, CVA, palpatations, mental 

health, musculoskeletal)? 


@  -Fall, head injury, hematoma, concussion, intracranial hemorrhage, this list 

is not all inclusive


EKG interpreted by me (3pts min.).


@  -None


X-rays interpreted by me (1pt min.).


@  -None done


CT interpreted by me (1pt min.).


@  -CT brain and C-spine shows no evidence of acute intracranial hemorrhage or 

C-spine fracture


U/S interpreted by me (1pt. min.).


@  -None done


What testing was considered but not performed or refused? (CT, X-rays, U/S, 

labs)? Why?


@  -None


What meds were considered but not given or refused? Why?


@  -None


Did you discuss the management of the patient with other professionals 

(professionals i.e. , PA, NP, lab, RT, psych nurse, , , 

teacher, , )? Give summary


@  -No


Was smoking cessation discussed for >3mins.?


@  -No


Was critical care preformed (if so, how long)?


@  -No


Were there social determinants of health that impacted care today? How? 

(Homelessness, low income, unemployed, alcoholism, drug addiction, 

transportation, low edu. Level, literacy, decrease access to med. care, residential, 

rehab)?


@  -No


Was there de-escalation of care discussed even if they declined (Discuss DNR or 

withdrawal of care, Hospice)? DNR status


@  -No


What co-morbidities impacted this encounter? (DM, HTN, Smoking, COPD, CAD, 

Cancer, CVA, ARF, Chemo, Hep., AIDS, mental health diagnosis, sleep apnea, 

morbid obesity)?


@  -None


Was patient admitted / discharged? Hospital course, mention meds given and 

route, prescriptions, significant lab abnormalities, going to OR and other 

pertinent info.


@  -Discharge.  Patient presented to the emergency department for evaluation of 

fall with head injury.  Patient was placed in c-collar presentation to triage.  

She underwent a CT of the brain and C-spine revealing no evidence of acute 

intracranial hemorrhage or C-spine fracture or traumatic malalignment.  C-collar

 was removed.  Patient was updated on her tetanus vaccine as she has overlying 

abrasion on her scalp.  Advised the patient symptomatic treatment at this time. 

 She is understanding agreeable with plan.  Patient stable at time of discharge.

  Case discussed with Dr. Yan


Undiagnosed new problem with uncertain prognosis?


@  -No


Drug Therapy requiring intensive monitoring for toxicity (Heparin, Nitro, 

Insulin, Cardizem)?


@  -No


Were any procedures done?


@  -No


Diagnosis/symptom?


@  -Fall, head injury


Acute, or Chronic, or Acute on Chronic?


@  -acute


Uncomplicated (without systemic symptoms) or Complicated (systemic symptoms)?


@  -uncomplicated


Side effects of treatment?


@  -No


Exacerbation, Progression, or Severe Exacerbation?


@  -No


Poses a threat to life or bodily function? How? (Chest pain, USA, MI, pneumonia,

 PE, COPD, DKA, ARF, appy, cholecystitis, CVA, Diverticulitis, Homicidal, 

Suicidal, threat to staff... and all critical care pts)


@  -No








Disposition


Clinical Impression: 


 Scalp hematoma, Fall, Head injury





Disposition: HOME SELF-CARE


Condition: Stable


Instructions (If sedation given, give patient instructions):  Fall Prevention 

(ED)


Additional Instructions: 


Please ice the area. Utilize anti-inflammatory medications. Follow up with your 

primary care provider. Return to the emergency department for new or worsening 

symptoms. 


Is patient prescribed a controlled substance at d/c from ED?: No


Referrals: 


August Wu MD [Primary Care Provider] - 1-2 days